# Patient Record
Sex: MALE | Race: WHITE | NOT HISPANIC OR LATINO
[De-identification: names, ages, dates, MRNs, and addresses within clinical notes are randomized per-mention and may not be internally consistent; named-entity substitution may affect disease eponyms.]

---

## 2022-04-11 ENCOUNTER — APPOINTMENT (OUTPATIENT)
Dept: OTOLARYNGOLOGY | Facility: CLINIC | Age: 52
End: 2022-04-11

## 2022-04-11 PROBLEM — Z00.00 ENCOUNTER FOR PREVENTIVE HEALTH EXAMINATION: Status: ACTIVE | Noted: 2022-04-11

## 2023-07-24 ENCOUNTER — FORM ENCOUNTER (OUTPATIENT)
Age: 53
End: 2023-07-24

## 2023-07-24 ENCOUNTER — APPOINTMENT (OUTPATIENT)
Dept: ORTHOPEDIC SURGERY | Facility: CLINIC | Age: 53
End: 2023-07-24
Payer: COMMERCIAL

## 2023-07-24 VITALS — HEIGHT: 69 IN | BODY MASS INDEX: 25.18 KG/M2 | WEIGHT: 170 LBS

## 2023-07-24 DIAGNOSIS — Z78.9 OTHER SPECIFIED HEALTH STATUS: ICD-10-CM

## 2023-07-24 DIAGNOSIS — M23.92 UNSPECIFIED INTERNAL DERANGEMENT OF LEFT KNEE: ICD-10-CM

## 2023-07-24 PROCEDURE — 99213 OFFICE O/P EST LOW 20 MIN: CPT

## 2023-07-24 PROCEDURE — 73562 X-RAY EXAM OF KNEE 3: CPT | Mod: LT

## 2023-07-24 NOTE — HISTORY OF PRESENT ILLNESS
[Left Leg] : left leg [Gradual] : gradual [3] : 3 [2] : 2 [Dull/Aching] : dull/aching [Localized] : localized [Radiating] : radiating [Constant] : constant [de-identified] : Has on/off soreness left knee. No injury, no prior knee surgery. Gets occasional clicking, no locking or giving way. Has had similar flareups with his left knee for years.  [] : no [FreeTextEntry1] : LFT knee [FreeTextEntry5] : 52yr old male c/o of LFT knee pain that developed 10 yrs ago. Reports that it has worsen throughout the past couple of months. Denies receiving prior treatment. Denies specific injury/trauma   [FreeTextEntry7] : up and down the LFT leg

## 2023-07-24 NOTE — PHYSICAL EXAM
[NL (0)] : extension 0 degrees [5___] : hamstring 5[unfilled]/5 [Negative] : negative Kimberlee's [] : negative Lachmann [Left] : left knee [AP] : anteroposterior [Lateral] : lateral [There are no fractures, subluxations or dislocations. No significant abnormalities are seen] : There are no fractures, subluxations or dislocations. No significant abnormalities are seen [TWNoteComboBox7] : flexion 125 degrees

## 2023-07-25 ENCOUNTER — APPOINTMENT (OUTPATIENT)
Dept: MRI IMAGING | Facility: CLINIC | Age: 53
End: 2023-07-25
Payer: COMMERCIAL

## 2023-07-25 PROCEDURE — 73721 MRI JNT OF LWR EXTRE W/O DYE: CPT | Mod: LT

## 2023-08-01 ENCOUNTER — APPOINTMENT (OUTPATIENT)
Dept: ORTHOPEDIC SURGERY | Facility: CLINIC | Age: 53
End: 2023-08-01
Payer: COMMERCIAL

## 2023-08-01 VITALS — BODY MASS INDEX: 25.18 KG/M2 | HEIGHT: 69 IN | WEIGHT: 170 LBS

## 2023-08-01 PROCEDURE — 99213 OFFICE O/P EST LOW 20 MIN: CPT

## 2023-08-01 NOTE — PHYSICAL EXAM
[NL (0)] : extension 0 degrees [5___] : hamstring 5[unfilled]/5 [] : negative Lachmann [Negative] : negative Kimberlee's [Left] : left knee [AP] : anteroposterior [Lateral] : lateral [There are no fractures, subluxations or dislocations. No significant abnormalities are seen] : There are no fractures, subluxations or dislocations. No significant abnormalities are seen [TWNoteComboBox7] : flexion 125 degrees

## 2023-09-08 ENCOUNTER — APPOINTMENT (OUTPATIENT)
Dept: ORTHOPEDIC SURGERY | Facility: CLINIC | Age: 53
End: 2023-09-08
Payer: COMMERCIAL

## 2023-09-08 VITALS — HEIGHT: 69 IN | WEIGHT: 170 LBS | BODY MASS INDEX: 25.18 KG/M2

## 2023-09-08 PROCEDURE — 99214 OFFICE O/P EST MOD 30 MIN: CPT | Mod: 57

## 2023-09-08 NOTE — HISTORY OF PRESENT ILLNESS
[2] : 2 [Dull/Aching] : dull/aching [Intermittent] : intermittent [Household chores] : household chores [Leisure] : leisure [Nothing helps with pain getting better] : Nothing helps with pain getting better [Standing] : standing [Walking] : walking [de-identified] : 9/8/23: Patient is here for left knee pain that began 10 years ago, not due to injury. Swelling. Pain is medial. Intermittent clicking. Weakness with certain movements. Worsens with prolonged activity/bending. No prior injury. Saw Dr. Valenzuela in 7/2023, and got MRI. Was referred to Josh for arthroscopic consult.  [] : no [FreeTextEntry1] : left knee

## 2023-09-08 NOTE — IMAGING
[de-identified] : Mild effusion, no warmth, no ecchymosis Medial joint line tenderness to palpation Range of motion 0-130 5/5 quadriceps and hamstring strength Positive Hali No varus or valgus instability, negative lachman Motor and sensory intact distally Mildly antalgic gait

## 2023-09-08 NOTE — ASSESSMENT
[FreeTextEntry1] : Progressively worsening medial knee pain and mechanical symptoms for years.  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  He has failed extensive conservative treatment including anti-inflammatory medication and exercise program.  I do not think a corticosteroid injection will be helpful in the setting.  He does have a fairly large complex medial meniscal tear.  I do believe he is a candidate for arthroscopic partial medial meniscectomy.  Explained no guarantees with surgery but this is the most reasonable approach given the duration of symptoms.  He wants to proceed at the end of the year.  We will set up at his convenience.  The risks and benefits of surgery have been discussed. Risks include but are not limited to bleeding, infection, reaction to anesthesia, injury to blood vessels and nerves, malunion, nonunion, DVT, PE, necessity of repeat surgery, chronic pain, loss of limb and death. The patient understands the risks and agrees with the surgical plan. All questions have been answered.

## 2023-12-06 ENCOUNTER — NON-APPOINTMENT (OUTPATIENT)
Age: 53
End: 2023-12-06

## 2023-12-06 ENCOUNTER — APPOINTMENT (OUTPATIENT)
Dept: OTOLARYNGOLOGY | Facility: CLINIC | Age: 53
End: 2023-12-06
Payer: COMMERCIAL

## 2023-12-06 VITALS
BODY MASS INDEX: 25.18 KG/M2 | WEIGHT: 170 LBS | HEART RATE: 65 BPM | SYSTOLIC BLOOD PRESSURE: 144 MMHG | HEIGHT: 69 IN | DIASTOLIC BLOOD PRESSURE: 79 MMHG

## 2023-12-06 PROCEDURE — 99203 OFFICE O/P NEW LOW 30 MIN: CPT

## 2023-12-26 ENCOUNTER — APPOINTMENT (OUTPATIENT)
Dept: ORTHOPEDIC SURGERY | Facility: AMBULATORY SURGERY CENTER | Age: 53
End: 2023-12-26
Payer: COMMERCIAL

## 2023-12-26 PROCEDURE — 29879 ARTHRS KNE SRG ABRASJ ARTHRP: CPT | Mod: 59,LT

## 2023-12-26 PROCEDURE — 29879 ARTHRS KNE SRG ABRASJ ARTHRP: CPT | Mod: AS,59,LT

## 2023-12-26 PROCEDURE — 29881 ARTHRS KNE SRG MNISECTMY M/L: CPT | Mod: LT

## 2023-12-26 PROCEDURE — 29881 ARTHRS KNE SRG MNISECTMY M/L: CPT | Mod: AS,LT

## 2023-12-26 RX ORDER — OXYCODONE AND ACETAMINOPHEN 5; 325 MG/1; MG/1
5-325 TABLET ORAL
Qty: 10 | Refills: 0 | Status: ACTIVE | COMMUNITY
Start: 2023-12-26 | End: 1900-01-01

## 2023-12-28 ENCOUNTER — APPOINTMENT (OUTPATIENT)
Dept: OTOLARYNGOLOGY | Facility: CLINIC | Age: 53
End: 2023-12-28
Payer: COMMERCIAL

## 2023-12-28 VITALS
HEIGHT: 69 IN | SYSTOLIC BLOOD PRESSURE: 138 MMHG | DIASTOLIC BLOOD PRESSURE: 82 MMHG | HEART RATE: 67 BPM | BODY MASS INDEX: 25.18 KG/M2 | WEIGHT: 170 LBS

## 2023-12-28 DIAGNOSIS — H61.23 IMPACTED CERUMEN, BILATERAL: ICD-10-CM

## 2023-12-28 DIAGNOSIS — H93.8X3 OTHER SPECIFIED DISORDERS OF EAR, BILATERAL: ICD-10-CM

## 2023-12-28 PROCEDURE — 99212 OFFICE O/P EST SF 10 MIN: CPT | Mod: 25

## 2023-12-28 RX ORDER — SILDENAFIL 100 MG/1
100 TABLET, FILM COATED ORAL
Qty: 6 | Refills: 0 | Status: ACTIVE | COMMUNITY
Start: 2023-04-20

## 2023-12-28 NOTE — HISTORY OF PRESENT ILLNESS
[de-identified] : Rudy Dos Santos is a 52 yo male who presents for evaluation of bilateral aural fullness. He uses Q-tips. He used to have recurrent ear infections but none in the past ten years. He denies otalgia, otorrhea, tinnitus, vertigo. He notes diminished hearing. He denies fevers or chills. [FreeTextEntry1] : 12/28/23 - Mr. Dos Santos presents for follow-up. He used deborx drops. He denies otalgia, otorrhea, tinnitus, vertigo. He notes occasional diminished hearing. No fevers or chills.

## 2023-12-28 NOTE — PHYSICAL EXAM
[de-identified] : Narrow bilateral EACs. Impacted bilateral cerumen [Midline] : trachea located in midline position [Normal] : no rashes

## 2023-12-28 NOTE — ASSESSMENT
[FreeTextEntry1] : Rudy Dos Santos presents for follow-up. He used debrox drops. Bilateral cerumen was removed. HE has narrow bilateral EAC. He notes improvement in aural fullness and return of hearing to baseline.  - f/u in 6 mo.

## 2024-01-05 ENCOUNTER — APPOINTMENT (OUTPATIENT)
Dept: ORTHOPEDIC SURGERY | Facility: CLINIC | Age: 54
End: 2024-01-05
Payer: COMMERCIAL

## 2024-01-05 VITALS — HEIGHT: 69 IN | BODY MASS INDEX: 25.18 KG/M2 | WEIGHT: 170 LBS

## 2024-01-05 PROCEDURE — 99024 POSTOP FOLLOW-UP VISIT: CPT

## 2024-01-05 NOTE — IMAGING
[de-identified] : No effusion or warmth Clean and dry incisions, sutures in place. Limited ROM compatible with recent surgery Negative Gianluca Motor and sensory intact distally Mildly antalgic gait

## 2024-01-05 NOTE — HISTORY OF PRESENT ILLNESS
[Dull/Aching] : dull/aching [Intermittent] : intermittent [Household chores] : household chores [Leisure] : leisure [Nothing helps with pain getting better] : Nothing helps with pain getting better [Standing] : standing [Walking] : walking [de-identified] : 9/8/23: Patient is here for left knee pain that began 10 years ago, not due to injury. Swelling. Pain is medial. Intermittent clicking. Weakness with certain movements. Worsens with prolonged activity/bending. No prior injury. Saw Dr. Valenzuela in 7/2023, and got MRI. Was referred to Josh for arthroscopic consult.   01/05/2024: here for a 1st p/o visit after L knee scope done on 12/26/23. no complaints after sx.  [] : This patient has had an injection before: no [FreeTextEntry1] : left knee  [FreeTextEntry6] : stiffness  [de-identified] : 12/26/23 [de-identified] : LT knee scope

## 2024-01-05 NOTE — ASSESSMENT
[FreeTextEntry1] : DOING WELL DURING THIS FIRST POSTOP VISIT SUTURES REMOVED PRECAUTIONS AND RESTRICTIONS REVIEWED IN DETAIL WE REVIEWED THE INTRAOPERATIVE FINDINGS IN DETAIL (INCLUDING SCOPE PICTURES WHERE APPLICABLE) ALL QUESTIONS ANSWERED  Given the arthritis noted medially and patellofemoral joint follow-up in a month and if still symptomatic could consider corticosteroid injection eventual viscosupplementation.

## 2024-02-09 ENCOUNTER — APPOINTMENT (OUTPATIENT)
Dept: ORTHOPEDIC SURGERY | Facility: CLINIC | Age: 54
End: 2024-02-09
Payer: COMMERCIAL

## 2024-02-09 VITALS — BODY MASS INDEX: 25.18 KG/M2 | WEIGHT: 170 LBS | HEIGHT: 69 IN

## 2024-02-09 DIAGNOSIS — S83.242A OTHER TEAR OF MEDIAL MENISCUS, CURRENT INJURY, LEFT KNEE, INITIAL ENCOUNTER: ICD-10-CM

## 2024-02-09 PROCEDURE — 99024 POSTOP FOLLOW-UP VISIT: CPT

## 2024-02-09 NOTE — HISTORY OF PRESENT ILLNESS
[Dull/Aching] : dull/aching [Intermittent] : intermittent [Household chores] : household chores [Leisure] : leisure [Nothing helps with pain getting better] : Nothing helps with pain getting better [Standing] : standing [Walking] : walking [de-identified] : 9/8/23: Patient is here for left knee pain that began 10 years ago, not due to injury. Swelling. Pain is medial. Intermittent clicking. Weakness with certain movements. Worsens with prolonged activity/bending. No prior injury. Saw Dr. Valenzuela in 7/2023, and got MRI. Was referred to Josh for arthroscopic consult.   01/05/2024: here for a 1st p/o visit after L knee scope done on 12/26/23. no complaints after sx.   02/09/2024: here to follow up for the L knee. states pain has improved since last visit.  [] : This patient has had an injection before: no [FreeTextEntry1] : left knee  [FreeTextEntry6] : stiffness  [de-identified] : 12/26/23 [de-identified] : LT knee scope

## 2024-02-09 NOTE — IMAGING
[de-identified] : No effusion, no warmth, no ecchymosis Medial joint line tenderness to palpation  Range of motion 0-130 with mild anterior crepitus 5/5 quadriceps and hamstring strength Ligamentously stable Motor and sensory intact distally Non antalgic gait Negative Gianluca

## 2024-02-09 NOTE — ASSESSMENT
[FreeTextEntry1] : Doing well now 5 weeks postop Reports mild flare-up while on vacation but now resolved Need for visco/csi discussed given the mild medial OA noted intraop f/u prn

## 2024-05-09 NOTE — HISTORY OF PRESENT ILLNESS
[Left Leg] : left leg [Gradual] : gradual [3] : 3 [2] : 2 [Dull/Aching] : dull/aching [Localized] : localized [Radiating] : radiating [Constant] : constant [de-identified] : Has on/off soreness left knee. No injury, no prior knee surgery. Gets occasional clicking, no locking or giving way. Has had similar flareups with his left knee for years.  [] : no [FreeTextEntry1] : LFT knee [FreeTextEntry5] : 52yr old male c/o of LFT knee pain that developed 10 yrs ago. Reports that it has worsen throughout the past couple of months. Denies receiving prior treatment. Denies specific injury/trauma   [FreeTextEntry7] : up and down the LFT leg Chronic pain or other acute medical condition

## 2024-06-27 ENCOUNTER — APPOINTMENT (OUTPATIENT)
Dept: OTOLARYNGOLOGY | Facility: CLINIC | Age: 54
End: 2024-06-27